# Patient Record
Sex: FEMALE | Race: WHITE | NOT HISPANIC OR LATINO | URBAN - METROPOLITAN AREA
[De-identification: names, ages, dates, MRNs, and addresses within clinical notes are randomized per-mention and may not be internally consistent; named-entity substitution may affect disease eponyms.]

---

## 2018-02-22 ENCOUNTER — EMERGENCY (EMERGENCY)
Facility: HOSPITAL | Age: 33
LOS: 1 days | Discharge: LEFT BEFORE DISCHARGE | End: 2018-02-22
Attending: EMERGENCY MEDICINE | Admitting: EMERGENCY MEDICINE
Payer: MEDICAID

## 2018-02-22 VITALS
SYSTOLIC BLOOD PRESSURE: 115 MMHG | OXYGEN SATURATION: 98 % | DIASTOLIC BLOOD PRESSURE: 64 MMHG | RESPIRATION RATE: 16 BRPM | HEART RATE: 89 BPM

## 2018-02-22 VITALS
HEIGHT: 65 IN | TEMPERATURE: 99 F | RESPIRATION RATE: 18 BRPM | DIASTOLIC BLOOD PRESSURE: 74 MMHG | HEART RATE: 90 BPM | WEIGHT: 110.01 LBS | OXYGEN SATURATION: 100 % | SYSTOLIC BLOOD PRESSURE: 110 MMHG

## 2018-02-22 DIAGNOSIS — Z98.1 ARTHRODESIS STATUS: Chronic | ICD-10-CM

## 2018-02-22 PROCEDURE — 99283 EMERGENCY DEPT VISIT LOW MDM: CPT

## 2018-02-22 PROCEDURE — 99284 EMERGENCY DEPT VISIT MOD MDM: CPT

## 2018-02-22 RX ORDER — IBUPROFEN 200 MG
600 TABLET ORAL ONCE
Qty: 0 | Refills: 0 | Status: DISCONTINUED | OUTPATIENT
Start: 2018-02-22 | End: 2018-02-26

## 2018-02-22 RX ORDER — ACETAMINOPHEN 500 MG
1000 TABLET ORAL ONCE
Qty: 0 | Refills: 0 | Status: DISCONTINUED | OUTPATIENT
Start: 2018-02-22 | End: 2018-02-26

## 2018-02-22 RX ORDER — LIDOCAINE 4 G/100G
1 CREAM TOPICAL ONCE
Qty: 0 | Refills: 0 | Status: DISCONTINUED | OUTPATIENT
Start: 2018-02-22 | End: 2018-02-26

## 2018-02-22 NOTE — ED ADULT NURSE REASSESSMENT NOTE - NS ED NURSE REASSESS COMMENT FT1
Pt signing out against medical advice.  Risks of signing out explained to pt including death.  Pt verbalized understanding. a&ox3, ambulating without difficulty.  vss.

## 2018-02-22 NOTE — ED PROVIDER NOTE - PHYSICAL EXAMINATION
GEN: NAD, awake, eyes open spontaneously  HEENT: NCAT, MMM, Trachea midline, normal conjunctiva, perrl  CHEST/LUNGS: Non-tachypneic, CTAB, bilateral breath sounds  CARDIAC: Non-tachycardic, normal perfusion  ABDOMEN: Soft, NTND, No rebound/guarding  MSK: No edema, no gross deformity of extremities  SKIN: No rashes, no petechiae, no vesicles  NEURO: CN grossly intact, normal coordination, no focal motor or sensory deficits The patient is manifesting multiple pain seeking attributes.  Discussed with patient that opiod pain medication will not be given during the ED visit.  Alternative analgesia is offfered CN 2-12 intact, normal coordination, normal finger to nose, normal heel to shin, negative Romberg, no pronator drift, no gait instability, 5/5 strength in upper and lower extremities, normal sensory throughout, alert and oriented to person, place, time, and situation.   PSYCH: Alert, appropriate, cooperative, with capacity and insight GEN: NAD, awake, eyes open spontaneously  HEENT: NCAT, MMM, Trachea midline, normal conjunctiva, perrl, pupils 7-8 mm dilated  CHEST/LUNGS: Non-tachypneic, CTAB, bilateral breath sounds  CARDIAC: Non-tachycardic, normal perfusion  ABDOMEN: Soft, NTND, No rebound/guarding  MSK: No edema, no gross deformity of extremities, right arm with normal passive range of motion, no warmth to lateral deltoid region, no warmth to shoulder, no erythema, no punctate lesion from reported injection and popping, no skin ecchymosis  SKIN: No rashes, no petechiae, no vesicles  NEURO: CN grossly intact, normal coordination, no focal motor or sensory deficits The patient is manifesting multiple pain seeking attributes.   CN 2-12 intact, normal coordination, normal finger to nose, normal heel to shin, negative Romberg, no pronator drift, no gait instability, 5/5 strength in upper and lower extremities, normal sensory throughout, alert and oriented to person, place, time, and situation.   PSYCH: Alert, appropriate, cooperative, with capacity and insight

## 2018-02-22 NOTE — ED ADULT NURSE NOTE - OBJECTIVE STATEMENT
32y female with hx of drug abuse presents to the ER c/o right arm pain. pt is alert and oriented x 4 and speaking coherently. pupils dilated. upon assessment. Pt states she "skin popped" heroin and cocaine into her right shoulder last friday. pt states since then she has had sharp shooting pains to the right arm. pt able to move fingers and arm- sensation intact. pt using sling for comfort. vss. in nad. will reassess.

## 2018-02-22 NOTE — ED ADULT NURSE NOTE - CHIEF COMPLAINT QUOTE
pt injected herself with heroin to right shoulder 1 week ago, then developed rt arm pain.  Unable to move arm due to pain.  Pt denies fall, injury, chest pain, SOB.

## 2018-02-22 NOTE — ED ADULT NURSE NOTE - EXPLANATION OF PATIENT'S REASON FOR LEAVING
pt states she wanted medication stronger than motrin or tylenol. pt states she does not want imaging.

## 2018-02-22 NOTE — ED PROVIDER NOTE - CARE PLAN
Principal Discharge DX:	Shoulder pain, right  Secondary Diagnosis:	Drug abuse and dependence  Secondary Diagnosis:	Drug-seeking behavior Principal Discharge DX:	Shoulder pain, right  Assessment and plan of treatment:	You are leaving against my medical advice.  I have informed the you about the risks, benefits, and alternatives to the evaluation and treatment of their condition.  You reported understanding of these issues and has the capacity and insight to make important medical decisions. Clear return precautions were discussed, you are urged to seek follow-up care, with the appropriate referrals made as needed. The necessity to follow up with PMD/Clinic/Specialist/follow up was provided and encouraged to be done within 2-3 days. You understand that this decision to go against medical advice can be changed at any time and that you can return for re-evaluation and further treatment with any changes in condition or concerns. You understand that they can and will receive healthcare services regardless of ability to pay and that the hospital can and will work with them on any financial issue, and that this is not a reason to decline care.   Follow up with your neurologist in 2-3 days or call our clinic at [phone].   Follow up with orthopedics for an evaluation of your shoulder, you must call [phone] and arrange your follow up appointment.   Follow up with your medical doctor in 2-3 days or call our clinic at [phone] and state you were seen in the Emergency Department and would like to be seen in clinic.     Take Tylenol 1 g every six hours and supplement (if allowed by your physician) with ibuprofen 600 mg, with food, every six hours which can be taken three hours apart from the Tylenol to have a layered effect.    Drink at least 2 Liters or 64 Ounces of water each day (UNLESS you are supposed to restrict fluids or have a history of congestive heart failure (CHF)).    Return for any persistent, worsening symptoms, or ANY concerns at all.  Secondary Diagnosis:	Drug abuse and dependence  Secondary Diagnosis:	Drug-seeking behavior

## 2018-02-22 NOTE — ED PROVIDER NOTE - NS ED ROS FT
No fever, no chills, no change in vision, no throat pain, no chest pain, no abdominal pain, no joint pain, no rashes, no focal neurologic complaints,  all ROS otherwise as per HPI or negative. No fever, no chills, no change in vision, no throat pain, no chest pain, no abdominal pain, no other joint pain, no rashes, no other focal neurologic complaints,  all ROS otherwise as per HPI or negative.

## 2018-02-22 NOTE — ED PROVIDER NOTE - PLAN OF CARE
You are leaving against my medical advice.  I have informed the you about the risks, benefits, and alternatives to the evaluation and treatment of their condition.  You reported understanding of these issues and has the capacity and insight to make important medical decisions. Clear return precautions were discussed, you are urged to seek follow-up care, with the appropriate referrals made as needed. The necessity to follow up with PMD/Clinic/Specialist/follow up was provided and encouraged to be done within 2-3 days. You understand that this decision to go against medical advice can be changed at any time and that you can return for re-evaluation and further treatment with any changes in condition or concerns. You understand that they can and will receive healthcare services regardless of ability to pay and that the hospital can and will work with them on any financial issue, and that this is not a reason to decline care.   Follow up with your neurologist in 2-3 days or call our clinic at [phone].   Follow up with orthopedics for an evaluation of your shoulder, you must call [phone] and arrange your follow up appointment.   Follow up with your medical doctor in 2-3 days or call our clinic at [phone] and state you were seen in the Emergency Department and would like to be seen in clinic.     Take Tylenol 1 g every six hours and supplement (if allowed by your physician) with ibuprofen 600 mg, with food, every six hours which can be taken three hours apart from the Tylenol to have a layered effect.    Drink at least 2 Liters or 64 Ounces of water each day (UNLESS you are supposed to restrict fluids or have a history of congestive heart failure (CHF)).    Return for any persistent, worsening symptoms, or ANY concerns at all.

## 2018-02-22 NOTE — ED ADULT NURSE NOTE - NS ED NURSE ELOPE COMMENTS
MD stephens aware of pt elopement. pt VSS. pt did not have IV. pt states she wanted "something stronger than motrin or tylenol"

## 2018-02-22 NOTE — ED PROVIDER NOTE - PROGRESS NOTE DETAILS
Parish Celis MD: Patient and significant other left to waiting room, patient informed she could stay, be treated, and obtain objective imaging of injury and reported weakness.   Patient and significant other left to the waiting room at this time, call was made ahead to triage and nurse manager of situation, if patient to leave premisses will have to list as eloped. The patient is leaving against my medical advice.  I have informed the patient about the risks, benefits, and alternatives to the evaluation and treatment of their condition.  The patient reports understanding of these issues and has the capacity and insight to make important medical decisions. Clear return precautions were discussed, the patient was urged to seek follow-up care, with appropriate referrals made as needed. The necessity to follow up with PMD/Clinic/Specialist/follow up was provided and encouraged to be done within 2-3 days. The patient understands that this decision to go against medical advice can be changed at any time and the patient can return for re-evaluation and further treatment with any changes in condition or concerns. The patient understands that they can and will receive healthcare services regardless of ability to pay and that the hospital can and will work with them on any financial issue, and that this is not a reason to decline care. I have made the best effort to inform the primary care physician and their family members concerning this decision. The patient understands all the reasons to return to the Emergency Department, including any concerns at all, the patient reports understanding of above with capacity and insight.

## 2018-02-22 NOTE — ED PROVIDER NOTE - OBJECTIVE STATEMENT
32 yr old female sober for 5 years, Friday IV cocaine in upper shoulder, right arm pain cant move with numbness. No fever or chills. Has taken Motrin for 3 days but with no relief. Past surgical history spinal fusion L2 and L3.   The patient is manifesting multiple pain seeking attributes.  Discussed with patient that opiod pain medication will not be given during the ED visit.  Alternative analgesia is offfered 32 yr old female sober for 5 years, Friday IV cocaine in upper shoulder, right arm pain cant move with numbness. No fever or chills. Has taken Motrin for 3 days but with no relief. Past surgical history spinal fusion L2 and L3.   The patient is manifesting multiple pain seeking attributes.  Discussed with patient that opioid pain medication will not be given during the ED visit.  Alternative analgesia is offered  The patient is manifesting multiple pain seeking attributes.  Discussed with patient that opiod pain medication will not be given during the ED visit.  Alternative analgesia is offfered 32 yr old female sober for 5 years, Friday patient 'skin popped' and injected cocaine in upper right lateral shoulder over [points to deltoid region], right arm pain cant move with numbness. No fever or chills. Has taken Motrin for 3 days but with no relief. Past surgical history spinal fusion L2 and L3.   No mechanical injury or history of remote trauma to the region, no history of surgery to the region. + reported numbness and tingling and increased pain with range of motion of shoulder but patient able to do so.  The patient is manifesting multiple pain seeking attributes.  Discussed with patient that opioid pain medication will not be given during the ED visit.  Alternative analgesia is offered
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
## 2018-02-22 NOTE — ED ADULT NURSE NOTE - NSELOPED_ED_ALL_ED
Physician notified I have personally seen and examined this patient.  I have fully participated in the care of this patient. I have reviewed all pertinent clinical information, including history, physical exam, plan and the Resident’s note and agree except as noted.

## 2018-02-22 NOTE — ED ADULT NURSE NOTE - NS ED NURSE DISCH DISPOSITION
Eloped (saw a physician/midlevel provider but left without telling anyone) AMA (saw a physician/midlevel provider and clinician was able to provide reasons for staying for treatment & form is signed)

## 2018-02-23 ENCOUNTER — EMERGENCY (EMERGENCY)
Facility: HOSPITAL | Age: 33
LOS: 1 days | Discharge: LEFT BEFORE DISCHARGE | End: 2018-02-23
Attending: EMERGENCY MEDICINE
Payer: MEDICAID

## 2018-02-23 VITALS
WEIGHT: 110.01 LBS | DIASTOLIC BLOOD PRESSURE: 78 MMHG | SYSTOLIC BLOOD PRESSURE: 112 MMHG | TEMPERATURE: 101 F | RESPIRATION RATE: 20 BRPM | HEART RATE: 107 BPM | HEIGHT: 65 IN | OXYGEN SATURATION: 96 %

## 2018-02-23 DIAGNOSIS — Z98.1 ARTHRODESIS STATUS: Chronic | ICD-10-CM

## 2018-02-23 LAB
ALBUMIN SERPL ELPH-MCNC: 3.3 G/DL — LOW (ref 3.5–5)
ALP SERPL-CCNC: 69 U/L — SIGNIFICANT CHANGE UP (ref 40–120)
ALT FLD-CCNC: 16 U/L DA — SIGNIFICANT CHANGE UP (ref 10–60)
ANION GAP SERPL CALC-SCNC: 6 MMOL/L — SIGNIFICANT CHANGE UP (ref 5–17)
APPEARANCE UR: ABNORMAL
AST SERPL-CCNC: 20 U/L — SIGNIFICANT CHANGE UP (ref 10–40)
BASOPHILS # BLD AUTO: 0.1 K/UL — SIGNIFICANT CHANGE UP (ref 0–0.2)
BASOPHILS NFR BLD AUTO: 0.9 % — SIGNIFICANT CHANGE UP (ref 0–2)
BILIRUB SERPL-MCNC: 0.3 MG/DL — SIGNIFICANT CHANGE UP (ref 0.2–1.2)
BILIRUB UR-MCNC: NEGATIVE — SIGNIFICANT CHANGE UP
BUN SERPL-MCNC: 10 MG/DL — SIGNIFICANT CHANGE UP (ref 7–18)
CALCIUM SERPL-MCNC: 9 MG/DL — SIGNIFICANT CHANGE UP (ref 8.4–10.5)
CHLORIDE SERPL-SCNC: 101 MMOL/L — SIGNIFICANT CHANGE UP (ref 96–108)
CO2 SERPL-SCNC: 28 MMOL/L — SIGNIFICANT CHANGE UP (ref 22–31)
COLOR SPEC: YELLOW — SIGNIFICANT CHANGE UP
CREAT SERPL-MCNC: 0.67 MG/DL — SIGNIFICANT CHANGE UP (ref 0.5–1.3)
CRP SERPL-MCNC: 8.3 MG/DL — HIGH (ref 0–0.4)
DIFF PNL FLD: ABNORMAL
EOSINOPHIL # BLD AUTO: 0.2 K/UL — SIGNIFICANT CHANGE UP (ref 0–0.5)
EOSINOPHIL NFR BLD AUTO: 3 % — SIGNIFICANT CHANGE UP (ref 0–6)
ERYTHROCYTE [SEDIMENTATION RATE] IN BLOOD: 73 MM/HR — HIGH (ref 0–15)
GLUCOSE SERPL-MCNC: 83 MG/DL — SIGNIFICANT CHANGE UP (ref 70–99)
GLUCOSE UR QL: NEGATIVE — SIGNIFICANT CHANGE UP
HCG UR QL: NEGATIVE — SIGNIFICANT CHANGE UP
HCT VFR BLD CALC: 41.6 % — SIGNIFICANT CHANGE UP (ref 34.5–45)
HGB BLD-MCNC: 13.1 G/DL — SIGNIFICANT CHANGE UP (ref 11.5–15.5)
KETONES UR-MCNC: NEGATIVE — SIGNIFICANT CHANGE UP
LEUKOCYTE ESTERASE UR-ACNC: ABNORMAL
LYMPHOCYTES # BLD AUTO: 1.3 K/UL — SIGNIFICANT CHANGE UP (ref 1–3.3)
LYMPHOCYTES # BLD AUTO: 18.9 % — SIGNIFICANT CHANGE UP (ref 13–44)
MCHC RBC-ENTMCNC: 30.2 PG — SIGNIFICANT CHANGE UP (ref 27–34)
MCHC RBC-ENTMCNC: 31.4 GM/DL — LOW (ref 32–36)
MCV RBC AUTO: 96 FL — SIGNIFICANT CHANGE UP (ref 80–100)
MONOCYTES # BLD AUTO: 0.7 K/UL — SIGNIFICANT CHANGE UP (ref 0–0.9)
MONOCYTES NFR BLD AUTO: 10.7 % — SIGNIFICANT CHANGE UP (ref 2–14)
NEUTROPHILS # BLD AUTO: 4.6 K/UL — SIGNIFICANT CHANGE UP (ref 1.8–7.4)
NEUTROPHILS NFR BLD AUTO: 66.5 % — SIGNIFICANT CHANGE UP (ref 43–77)
NITRITE UR-MCNC: NEGATIVE — SIGNIFICANT CHANGE UP
PCP SPEC-MCNC: SIGNIFICANT CHANGE UP
PH UR: 6 — SIGNIFICANT CHANGE UP (ref 5–8)
PLATELET # BLD AUTO: 356 K/UL — SIGNIFICANT CHANGE UP (ref 150–400)
POTASSIUM SERPL-MCNC: 4.2 MMOL/L — SIGNIFICANT CHANGE UP (ref 3.5–5.3)
POTASSIUM SERPL-SCNC: 4.2 MMOL/L — SIGNIFICANT CHANGE UP (ref 3.5–5.3)
PROT SERPL-MCNC: 8.1 G/DL — SIGNIFICANT CHANGE UP (ref 6–8.3)
PROT UR-MCNC: 15
RBC # BLD: 4.34 M/UL — SIGNIFICANT CHANGE UP (ref 3.8–5.2)
RBC # FLD: 11.5 % — SIGNIFICANT CHANGE UP (ref 10.3–14.5)
SODIUM SERPL-SCNC: 135 MMOL/L — SIGNIFICANT CHANGE UP (ref 135–145)
SP GR SPEC: 1.02 — SIGNIFICANT CHANGE UP (ref 1.01–1.02)
UROBILINOGEN FLD QL: NEGATIVE — SIGNIFICANT CHANGE UP
WBC # BLD: 6.8 K/UL — SIGNIFICANT CHANGE UP (ref 3.8–10.5)
WBC # FLD AUTO: 6.8 K/UL — SIGNIFICANT CHANGE UP (ref 3.8–10.5)

## 2018-02-23 PROCEDURE — 81001 URINALYSIS AUTO W/SCOPE: CPT

## 2018-02-23 PROCEDURE — 85027 COMPLETE CBC AUTOMATED: CPT

## 2018-02-23 PROCEDURE — 85652 RBC SED RATE AUTOMATED: CPT

## 2018-02-23 PROCEDURE — 96372 THER/PROPH/DIAG INJ SC/IM: CPT

## 2018-02-23 PROCEDURE — 81025 URINE PREGNANCY TEST: CPT

## 2018-02-23 PROCEDURE — 73080 X-RAY EXAM OF ELBOW: CPT | Mod: 26,RT

## 2018-02-23 PROCEDURE — 99284 EMERGENCY DEPT VISIT MOD MDM: CPT | Mod: 25

## 2018-02-23 PROCEDURE — 80053 COMPREHEN METABOLIC PANEL: CPT

## 2018-02-23 PROCEDURE — 99285 EMERGENCY DEPT VISIT HI MDM: CPT

## 2018-02-23 PROCEDURE — 73080 X-RAY EXAM OF ELBOW: CPT

## 2018-02-23 PROCEDURE — 86140 C-REACTIVE PROTEIN: CPT

## 2018-02-23 PROCEDURE — 73030 X-RAY EXAM OF SHOULDER: CPT

## 2018-02-23 PROCEDURE — 80307 DRUG TEST PRSMV CHEM ANLYZR: CPT

## 2018-02-23 PROCEDURE — 87040 BLOOD CULTURE FOR BACTERIA: CPT

## 2018-02-23 PROCEDURE — 73030 X-RAY EXAM OF SHOULDER: CPT | Mod: 26,RT

## 2018-02-23 RX ORDER — KETOROLAC TROMETHAMINE 30 MG/ML
30 SYRINGE (ML) INJECTION ONCE
Qty: 0 | Refills: 0 | Status: DISCONTINUED | OUTPATIENT
Start: 2018-02-23 | End: 2018-02-23

## 2018-02-23 RX ORDER — IBUPROFEN 200 MG
600 TABLET ORAL ONCE
Qty: 0 | Refills: 0 | Status: DISCONTINUED | OUTPATIENT
Start: 2018-02-23 | End: 2018-02-23

## 2018-02-23 RX ORDER — SODIUM CHLORIDE 9 MG/ML
1000 INJECTION INTRAMUSCULAR; INTRAVENOUS; SUBCUTANEOUS ONCE
Qty: 0 | Refills: 0 | Status: COMPLETED | OUTPATIENT
Start: 2018-02-23 | End: 2018-02-23

## 2018-02-23 RX ORDER — ACETAMINOPHEN 500 MG
650 TABLET ORAL ONCE
Qty: 0 | Refills: 0 | Status: COMPLETED | OUTPATIENT
Start: 2018-02-23 | End: 2018-02-23

## 2018-02-23 RX ADMIN — Medication 30 MILLIGRAM(S): at 14:15

## 2018-02-23 RX ADMIN — Medication 650 MILLIGRAM(S): at 13:12

## 2018-02-23 RX ADMIN — SODIUM CHLORIDE 1000 MILLILITER(S): 9 INJECTION INTRAMUSCULAR; INTRAVENOUS; SUBCUTANEOUS at 13:12

## 2018-02-23 NOTE — ED ADULT TRIAGE NOTE - CHIEF COMPLAINT QUOTE
Pain to right arm radiating from shoulder to hand. Fell in shower x l;ast Friday. Unable to lift arm up to shoulder level.

## 2018-02-23 NOTE — ED PROVIDER NOTE - CONSTITUTIONAL, MLM
normal... Well appearing, well nourished, awake, alert, oriented to person, place, time/situation and crying secondary to pain.

## 2018-02-23 NOTE — ED PROVIDER NOTE - MEDICAL DECISION MAKING DETAILS
33 y/o F pt presents with R shoulder pain s/p fall x 1 week. In ED, pt is febrile but did not endorse having a fever --- concern for infection. Prior chart notes drug seeking behavior. Pt did not endorse recreational drug use on HPI. Given the pt's symptoms and injection, will evaluated for infection. 31 y/o F pt presents with R shoulder pain s/p fall x 1 week. In ED, pt is febrile but did not endorse having a fever --- concern for infection. Prior chart notes drug seeking behavior. Pt did not endorse recreational drug use on HPI. Given the pt's symptoms and injection, will evaluated for infection. Will consider CT scan given patients pain 31 y/o F pt presents with R shoulder pain s/p fall x 1 week. In ED, pt is febrile but did not endorse having a fever --- concern for infection. Prior chart notes drug seeking behavior. Pt did not endorse recreational drug use on HPI. Pt does take Buprenorphine, but did not take today. 31 y/o F pt presents with R shoulder pain s/p fall x 1 week. In ED, pt is febrile but did not endorse having a fever --- concern for infection. Prior chart notes drug seeking behavior. Pt did not endorse recreational drug use on HPI. Pt does take Buprenorphine, but did not take today. Pt with persistent fever, I expressed that I look for a source of the fever, including influenza/rvp, xr, and if negative, pursue further testing of the arm, pt requested me to remove the IV, refused xr. Pt is alert and oriented. The pt understands the illness, suggested treatment and risks and benefits. They understand the risk including permanent disability, or death. The pt is acting rationally and does not want our suggested treatment. They were instructed to f/u with their pmd immediately and instructed to return with any concerns.

## 2018-02-23 NOTE — ED PROVIDER NOTE - MUSCULOSKELETAL, MLM
Pt is not willing to move her R shoulder, no fluctuance, no crepitus. pt with swelling over her right shoulder, right elbow, pain with ROM of shoulder/elbow, no crepitus no fluctuance

## 2018-02-23 NOTE — ED PROVIDER NOTE - OBJECTIVE STATEMENT
33 y/o F pt with PMHx of seizure disorder (on Kepra) and lower back pain s/p spinal surgery presents to ED c/o R shoulder pain s/p mechanical fall x 1 week ago. Pt reports that she slipped and fell while in the shower, landing on her R shoulder. Pt states that she went to a different hospital yesterday but "nothing was done for [her]." Pt denies head trauma, LOC, or any other complaints. Review of prior charts note opioid dependance (Suboxone) and concern for drug seeking behavior. Per prior note, pt injected IV cocaine into her R upper shoulder. 31 y/o F pt with PMHx of seizure disorder (on Kepra) and lower back pain s/p spinal surgery presents to ED c/o R shoulder pain s/p mechanical fall x 1 week ago. Pt reports that she slipped and fell while in the shower, landing on her R shoulder. Pt states that she went to a different hospital yesterday but "nothing was done for [her]." Pt denies head trauma, LOC, or any other complaints. Review of prior charts note opioid dependance (Suboxone) and concern for drug seeking behavior. Per prior note, pt injected IV cocaine into her R upper shoulder. Pt denies any behavior today. 31 y/o F pt with PMHx of seizure disorder (on Kepra) and lower back pain s/p spinal surgery presents to ED c/o R shoulder pain s/p mechanical fall x 1 week ago. Pt reports that she slipped and fell while in the shower, landing on her R shoulder. Pt states that she went to a different hospital yesterday but "nothing was done for [her]." Pt denies head trauma, LOC, or any other complaints. Review of prior charts note opioid dependance (Suboxone) and concern for drug seeking behavior. Per prior note, pt injected IV cocaine into her R upper shoulder. Pt denies any behavior today. On reassessment, I asked pt specifically regarding the injections, pt endorses injected into arm years ago, no recent injections.

## 2018-02-23 NOTE — ED PROVIDER NOTE - CARE PLAN
Principal Discharge DX:	Pain of right upper extremity  Secondary Diagnosis:	Fever, unspecified fever cause

## 2018-02-27 NOTE — ED POST DISCHARGE NOTE - ADDITIONAL DOCUMENTATION
left message 2/27/2018  letter sent 2/27/2018 left message 2/27/2018  letter sent 2/27/2018. 4/11/18 - pt contacted and informed of results.  pt has MRI scheduled.

## 2018-02-28 LAB
CULTURE RESULTS: SIGNIFICANT CHANGE UP
CULTURE RESULTS: SIGNIFICANT CHANGE UP
SPECIMEN SOURCE: SIGNIFICANT CHANGE UP
SPECIMEN SOURCE: SIGNIFICANT CHANGE UP

## 2019-06-06 ENCOUNTER — OUTPATIENT (OUTPATIENT)
Dept: OUTPATIENT SERVICES | Facility: HOSPITAL | Age: 34
LOS: 1 days | Discharge: STILL ADMITTED OR ADMINISTRATIVE | End: 2019-06-06

## 2019-06-06 DIAGNOSIS — Z98.1 ARTHRODESIS STATUS: Chronic | ICD-10-CM

## 2019-06-07 DIAGNOSIS — G47.00 INSOMNIA, UNSPECIFIED: ICD-10-CM

## 2019-06-07 DIAGNOSIS — F41.9 ANXIETY DISORDER, UNSPECIFIED: ICD-10-CM

## 2019-06-07 PROBLEM — M54.5 LOW BACK PAIN: Chronic | Status: ACTIVE | Noted: 2018-02-23

## 2019-10-07 PROBLEM — Z00.00 ENCOUNTER FOR PREVENTIVE HEALTH EXAMINATION: Status: ACTIVE | Noted: 2019-10-07

## 2019-10-30 ENCOUNTER — NON-APPOINTMENT (OUTPATIENT)
Age: 34
End: 2019-10-30

## 2019-10-30 ENCOUNTER — APPOINTMENT (OUTPATIENT)
Dept: CARDIOLOGY | Facility: CLINIC | Age: 34
End: 2019-10-30
Payer: MEDICAID

## 2019-10-30 VITALS
HEART RATE: 82 BPM | DIASTOLIC BLOOD PRESSURE: 82 MMHG | WEIGHT: 109 LBS | RESPIRATION RATE: 16 BRPM | HEIGHT: 65 IN | OXYGEN SATURATION: 99 % | SYSTOLIC BLOOD PRESSURE: 118 MMHG | BODY MASS INDEX: 18.16 KG/M2

## 2019-10-30 DIAGNOSIS — Z82.49 FAMILY HISTORY OF ISCHEMIC HEART DISEASE AND OTHER DISEASES OF THE CIRCULATORY SYSTEM: ICD-10-CM

## 2019-10-30 DIAGNOSIS — R06.00 DYSPNEA, UNSPECIFIED: ICD-10-CM

## 2019-10-30 DIAGNOSIS — I34.0 NONRHEUMATIC MITRAL (VALVE) INSUFFICIENCY: ICD-10-CM

## 2019-10-30 DIAGNOSIS — R56.9 UNSPECIFIED CONVULSIONS: ICD-10-CM

## 2019-10-30 DIAGNOSIS — F17.210 NICOTINE DEPENDENCE, CIGARETTES, UNCOMPLICATED: ICD-10-CM

## 2019-10-30 DIAGNOSIS — Z87.01 PERSONAL HISTORY OF PNEUMONIA (RECURRENT): ICD-10-CM

## 2019-10-30 PROCEDURE — 93000 ELECTROCARDIOGRAM COMPLETE: CPT

## 2019-10-30 PROCEDURE — 99203 OFFICE O/P NEW LOW 30 MIN: CPT

## 2019-10-30 RX ORDER — ZOLPIDEM TARTRATE 5 MG/1
TABLET, FILM COATED ORAL
Refills: 0 | Status: ACTIVE | COMMUNITY

## 2019-10-30 RX ORDER — LEVETIRACETAM 1000 MG/1
1000 TABLET, FILM COATED ORAL
Refills: 0 | Status: ACTIVE | COMMUNITY

## 2019-10-30 RX ORDER — BUPRENORPHINE HYDROCHLORIDE, NALOXONE HYDROCHLORIDE 8; 2 MG/1; MG/1
8-2 FILM, SOLUBLE BUCCAL; SUBLINGUAL
Refills: 0 | Status: ACTIVE | COMMUNITY

## 2019-10-30 RX ORDER — GABAPENTIN 400 MG/1
CAPSULE ORAL
Refills: 0 | Status: ACTIVE | COMMUNITY

## 2019-10-30 RX ORDER — LORAZEPAM 2 MG/1
TABLET ORAL
Refills: 0 | Status: ACTIVE | COMMUNITY

## 2019-10-30 NOTE — PHYSICAL EXAM
[General Appearance - Well Developed] : well developed [Normal Appearance] : normal appearance [Well Groomed] : well groomed [General Appearance - Well Nourished] : well nourished [No Deformities] : no deformities [General Appearance - In No Acute Distress] : no acute distress [Normal Conjunctiva] : the conjunctiva exhibited no abnormalities [Eyelids - No Xanthelasma] : the eyelids demonstrated no xanthelasmas [Normal Oral Mucosa] : normal oral mucosa [No Oral Pallor] : no oral pallor [No Oral Cyanosis] : no oral cyanosis [Normal Jugular Venous A Waves Present] : normal jugular venous A waves present [Normal Jugular Venous V Waves Present] : normal jugular venous V waves present [No Jugular Venous Calles A Waves] : no jugular venous calles A waves [Heart Rate And Rhythm] : heart rate and rhythm were normal [Heart Sounds] : normal S1 and S2 [Murmurs] : no murmurs present [Respiration, Rhythm And Depth] : normal respiratory rhythm and effort [Exaggerated Use Of Accessory Muscles For Inspiration] : no accessory muscle use [Auscultation Breath Sounds / Voice Sounds] : lungs were clear to auscultation bilaterally [Abdomen Soft] : soft [Abdomen Tenderness] : non-tender [Abdomen Mass (___ Cm)] : no abdominal mass palpated [Abnormal Walk] : normal gait [Gait - Sufficient For Exercise Testing] : the gait was sufficient for exercise testing [Nail Clubbing] : no clubbing of the fingernails [Cyanosis, Localized] : no localized cyanosis [Petechial Hemorrhages (___cm)] : no petechial hemorrhages [Skin Color & Pigmentation] : normal skin color and pigmentation [] : no rash [No Venous Stasis] : no venous stasis [Skin Lesions] : no skin lesions [No Skin Ulcers] : no skin ulcer [No Xanthoma] : no  xanthoma was observed [Oriented To Time, Place, And Person] : oriented to person, place, and time [Affect] : the affect was normal [Mood] : the mood was normal [No Anxiety] : not feeling anxious
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
## 2019-10-30 NOTE — DISCUSSION/SUMMARY
[FreeTextEntry1] : 34F with reported mild to moderate MR, recent hospitalization for PNA.  PEPPER negative for MR.  No evidence of endocarditis.  Still notes dyspnea, likely secondary to continued smoking and recent PNA. Continue to monitor.\par \par Obtain hospital records\par To follow up PRN

## 2019-10-30 NOTE — REVIEW OF SYSTEMS
[Dyspnea on exertion] : dyspnea during exertion [Palpitations] : palpitations [Dizziness] : dizziness [Negative] : Gastrointestinal [Fever] : no fever [Chills] : no chills [Shortness Of Breath] : no shortness of breath [Chest Pain] : no chest pain [Lower Ext Edema] : no extremity edema [Cough] : no cough [Abdominal Pain] : no abdominal pain [Heartburn] : no heartburn [Dysuria] : no dysuria [Joint Pain] : no joint pain [Skin: A Rash] : no rash:

## 2020-11-05 NOTE — ED ADULT TRIAGE NOTE - ACCOMPANIED BY
Patient has taken a pregnancy test.  States results were negative, but would like a blood test for confirmation.  She would be a new patient.   Spouse/Significant other

## 2025-05-10 NOTE — ED ADULT NURSE NOTE - NS ED NURSE LEVEL OF CONSCIOUSNESS ORIENTATION
Gabapentin held on admission due to possible concern of side effect of dysphagia   Resume gabapentin follow-up with GI outpatient   Oriented - self; Oriented - place; Oriented - time